# Patient Record
Sex: FEMALE | Race: WHITE | Employment: FULL TIME | URBAN - METROPOLITAN AREA
[De-identification: names, ages, dates, MRNs, and addresses within clinical notes are randomized per-mention and may not be internally consistent; named-entity substitution may affect disease eponyms.]

---

## 2020-08-15 ENCOUNTER — OFFICE VISIT (OUTPATIENT)
Dept: URGENT CARE | Facility: URGENT CARE | Age: 45
End: 2020-08-15
Payer: COMMERCIAL

## 2020-08-15 VITALS
OXYGEN SATURATION: 99 % | HEIGHT: 67 IN | HEART RATE: 64 BPM | DIASTOLIC BLOOD PRESSURE: 78 MMHG | TEMPERATURE: 99.2 F | WEIGHT: 116.2 LBS | BODY MASS INDEX: 18.24 KG/M2 | SYSTOLIC BLOOD PRESSURE: 132 MMHG

## 2020-08-15 DIAGNOSIS — S91.312A LACERATION OF FOOT, LEFT, INITIAL ENCOUNTER: Primary | ICD-10-CM

## 2020-08-15 PROCEDURE — 12001 RPR S/N/AX/GEN/TRNK 2.5CM/<: CPT | Performed by: FAMILY MEDICINE

## 2020-08-15 ASSESSMENT — MIFFLIN-ST. JEOR: SCORE: 1214.71

## 2020-08-16 NOTE — PROGRESS NOTES
"SUBJECTIVE:  Patient presents with fall on concrete at home resulting in a deep abrasion/laceration    No past medical history on file.  No current outpatient medications on file.     History   Smoking Status     Never Smoker   Smokeless Tobacco     Never Used         OBJECITVE;  /78   Pulse 64   Temp 99.2  F (37.3  C) (Oral)   Ht 1.702 m (5' 7\")   Wt 52.7 kg (116 lb 3.2 oz)   LMP 07/30/2020 (Approximate)   SpO2 99%   BMI 18.20 kg/m    4 cm abrasion on lt knee.  Center 1cm is so deep, it is through to subcutaneous fat.  ASSESSMENT:  Abrasion/laceration    PLAN:  Procedure:  LET was applied and abrasion was cleansed with sterile saline.  Xylocaine was injected 3ml.  The central 1 cm laceration was closed with 3 interrupted 4-0 ethilon sutures and tegderm over antibiotic oint was applied.  SR in 7 days.  Instructions on wound care were given to patient printed and verbal.                                                                                   "

## 2020-08-23 ENCOUNTER — OFFICE VISIT (OUTPATIENT)
Dept: URGENT CARE | Facility: URGENT CARE | Age: 45
End: 2020-08-23
Payer: COMMERCIAL

## 2020-08-23 VITALS — TEMPERATURE: 98.4 F

## 2020-08-23 DIAGNOSIS — S81.012D LACERATION OF LEFT KNEE, SUBSEQUENT ENCOUNTER: Primary | ICD-10-CM

## 2020-08-23 NOTE — PROGRESS NOTES
CHIEF COMPLAINT    F/U L knee lac      HISTORY    See note 8-15.    She has 3 Nylon sutures. Also abrasion. Has kept bandaged.      There is no problem list on file for this patient.      REVIEW OF SYSTEMS    Unremarkable except as above.      No past medical history on file.      EXAM  Temp 98.4  F (36.9  C)   LMP 07/30/2020 (Approximate)     L knee - 3 cm abrasion with sutures centrally, no drainage or redness.    Sutures removed uneventfully.      (S85.947R) Laceration of left knee, subsequent encounter  (primary encounter diagnosis)  Comment:   Plan:     Continue to keep bandaged until healed.  Ibuprofen twice daily 5 days.

## 2021-01-04 ENCOUNTER — HEALTH MAINTENANCE LETTER (OUTPATIENT)
Age: 46
End: 2021-01-04

## 2021-10-11 ENCOUNTER — HEALTH MAINTENANCE LETTER (OUTPATIENT)
Age: 46
End: 2021-10-11

## 2022-01-30 ENCOUNTER — HEALTH MAINTENANCE LETTER (OUTPATIENT)
Age: 47
End: 2022-01-30

## 2022-09-24 ENCOUNTER — HEALTH MAINTENANCE LETTER (OUTPATIENT)
Age: 47
End: 2022-09-24

## 2023-01-29 ENCOUNTER — HEALTH MAINTENANCE LETTER (OUTPATIENT)
Age: 48
End: 2023-01-29

## 2023-05-08 ENCOUNTER — HEALTH MAINTENANCE LETTER (OUTPATIENT)
Age: 48
End: 2023-05-08